# Patient Record
Sex: FEMALE | Race: WHITE | NOT HISPANIC OR LATINO | URBAN - METROPOLITAN AREA
[De-identification: names, ages, dates, MRNs, and addresses within clinical notes are randomized per-mention and may not be internally consistent; named-entity substitution may affect disease eponyms.]

---

## 2017-12-28 ENCOUNTER — APPOINTMENT (OUTPATIENT)
Dept: WOMENS IMAGING | Facility: HOSPITAL | Age: 42
End: 2017-12-28

## 2017-12-28 PROCEDURE — 77067 SCR MAMMO BI INCL CAD: CPT | Performed by: RADIOLOGY

## 2017-12-28 PROCEDURE — 77063 BREAST TOMOSYNTHESIS BI: CPT | Performed by: RADIOLOGY

## 2019-05-21 ENCOUNTER — APPOINTMENT (OUTPATIENT)
Dept: WOMENS IMAGING | Facility: HOSPITAL | Age: 44
End: 2019-05-21

## 2019-05-21 PROCEDURE — 77067 SCR MAMMO BI INCL CAD: CPT | Performed by: RADIOLOGY

## 2019-05-21 PROCEDURE — 77063 BREAST TOMOSYNTHESIS BI: CPT | Performed by: RADIOLOGY

## 2019-11-19 ENCOUNTER — OFFICE VISIT (OUTPATIENT)
Dept: GASTROENTEROLOGY | Facility: CLINIC | Age: 44
End: 2019-11-19

## 2019-11-19 VITALS — HEIGHT: 67 IN | BODY MASS INDEX: 36.88 KG/M2 | WEIGHT: 235 LBS

## 2019-11-19 DIAGNOSIS — R19.7 DIARRHEA, UNSPECIFIED TYPE: ICD-10-CM

## 2019-11-19 DIAGNOSIS — Z83.71 FH: COLON POLYPS: Primary | ICD-10-CM

## 2019-11-19 PROCEDURE — 99203 OFFICE O/P NEW LOW 30 MIN: CPT | Performed by: INTERNAL MEDICINE

## 2019-11-19 RX ORDER — ACETAMINOPHEN 325 MG/1
650 TABLET ORAL EVERY 6 HOURS PRN
COMMUNITY

## 2019-11-19 RX ORDER — NAPROXEN 500 MG/1
TABLET ORAL
Refills: 0 | COMMUNITY
Start: 2019-10-30 | End: 2022-04-12

## 2019-11-19 RX ORDER — DIPHENHYDRAMINE HCL 25 MG
25 CAPSULE ORAL EVERY 6 HOURS PRN
COMMUNITY
End: 2022-04-11

## 2020-06-01 RX ORDER — RIFAXIMIN 550 MG/1
TABLET ORAL
Qty: 42 TABLET | Refills: 2 | Status: SHIPPED | OUTPATIENT
Start: 2020-06-01 | End: 2022-09-30

## 2020-06-18 ENCOUNTER — APPOINTMENT (OUTPATIENT)
Dept: WOMENS IMAGING | Facility: HOSPITAL | Age: 45
End: 2020-06-18

## 2020-06-18 PROCEDURE — 77067 SCR MAMMO BI INCL CAD: CPT | Performed by: RADIOLOGY

## 2020-06-18 PROCEDURE — 77063 BREAST TOMOSYNTHESIS BI: CPT | Performed by: RADIOLOGY

## 2020-07-10 ENCOUNTER — APPOINTMENT (OUTPATIENT)
Dept: WOMENS IMAGING | Facility: HOSPITAL | Age: 45
End: 2020-07-10

## 2020-07-10 PROCEDURE — G0279 TOMOSYNTHESIS, MAMMO: HCPCS | Performed by: RADIOLOGY

## 2020-07-10 PROCEDURE — 77065 DX MAMMO INCL CAD UNI: CPT | Performed by: RADIOLOGY

## 2020-07-10 PROCEDURE — 76641 ULTRASOUND BREAST COMPLETE: CPT | Performed by: RADIOLOGY

## 2020-07-10 PROCEDURE — 77061 BREAST TOMOSYNTHESIS UNI: CPT | Performed by: RADIOLOGY

## 2021-03-30 ENCOUNTER — APPOINTMENT (OUTPATIENT)
Dept: WOMENS IMAGING | Facility: HOSPITAL | Age: 46
End: 2021-03-30

## 2021-03-30 PROCEDURE — 77065 DX MAMMO INCL CAD UNI: CPT | Performed by: RADIOLOGY

## 2021-03-30 PROCEDURE — 77061 BREAST TOMOSYNTHESIS UNI: CPT | Performed by: RADIOLOGY

## 2021-03-30 PROCEDURE — G0279 TOMOSYNTHESIS, MAMMO: HCPCS | Performed by: RADIOLOGY

## 2021-06-22 ENCOUNTER — APPOINTMENT (OUTPATIENT)
Dept: WOMENS IMAGING | Facility: HOSPITAL | Age: 46
End: 2021-06-22

## 2021-06-22 PROCEDURE — 77066 DX MAMMO INCL CAD BI: CPT | Performed by: RADIOLOGY

## 2021-06-22 PROCEDURE — 77062 BREAST TOMOSYNTHESIS BI: CPT | Performed by: RADIOLOGY

## 2021-06-22 PROCEDURE — G0279 TOMOSYNTHESIS, MAMMO: HCPCS | Performed by: RADIOLOGY

## 2021-11-24 ENCOUNTER — OFFICE VISIT (OUTPATIENT)
Dept: FAMILY MEDICINE CLINIC | Facility: CLINIC | Age: 46
End: 2021-11-24

## 2021-11-24 VITALS
WEIGHT: 243.6 LBS | TEMPERATURE: 97.7 F | DIASTOLIC BLOOD PRESSURE: 108 MMHG | SYSTOLIC BLOOD PRESSURE: 164 MMHG | BODY MASS INDEX: 38.24 KG/M2 | HEART RATE: 88 BPM | HEIGHT: 67 IN | OXYGEN SATURATION: 98 %

## 2021-11-24 DIAGNOSIS — R63.5 WEIGHT GAIN: ICD-10-CM

## 2021-11-24 DIAGNOSIS — J45.20 MILD INTERMITTENT ASTHMATIC BRONCHITIS WITHOUT COMPLICATION: ICD-10-CM

## 2021-11-24 DIAGNOSIS — R03.0 ELEVATED BP WITHOUT DIAGNOSIS OF HYPERTENSION: Primary | ICD-10-CM

## 2021-11-24 DIAGNOSIS — F90.2 ATTENTION DEFICIT HYPERACTIVITY DISORDER (ADHD), COMBINED TYPE: ICD-10-CM

## 2021-11-24 DIAGNOSIS — K58.9 IRRITABLE BOWEL SYNDROME, UNSPECIFIED TYPE: ICD-10-CM

## 2021-11-24 DIAGNOSIS — F41.9 ANXIETY: ICD-10-CM

## 2021-11-24 DIAGNOSIS — E66.01 MORBID (SEVERE) OBESITY DUE TO EXCESS CALORIES (HCC): ICD-10-CM

## 2021-11-24 PROBLEM — Z86.69 HISTORY OF MIGRAINE: Status: ACTIVE | Noted: 2021-11-24

## 2021-11-24 PROBLEM — F41.8 ANXIETY WITH DEPRESSION: Status: ACTIVE | Noted: 2021-11-24

## 2021-11-24 PROBLEM — I10 PRIMARY HYPERTENSION: Status: ACTIVE | Noted: 2021-11-24

## 2021-11-24 PROCEDURE — 99214 OFFICE O/P EST MOD 30 MIN: CPT | Performed by: FAMILY MEDICINE

## 2021-11-24 RX ORDER — BREXPIPRAZOLE 1 MG/1
1 TABLET ORAL DAILY
COMMUNITY

## 2021-11-25 PROBLEM — F42.9 OBSESSIVE-COMPULSIVE DISORDER: Status: ACTIVE | Noted: 2021-11-25

## 2021-11-25 LAB
ALBUMIN SERPL-MCNC: 4.3 G/DL (ref 3.8–4.8)
ALBUMIN/GLOB SERPL: 1.8 {RATIO} (ref 1.2–2.2)
ALP SERPL-CCNC: 65 IU/L (ref 44–121)
ALT SERPL-CCNC: 11 IU/L (ref 0–32)
AST SERPL-CCNC: 11 IU/L (ref 0–40)
BASOPHILS # BLD AUTO: 0 X10E3/UL (ref 0–0.2)
BASOPHILS NFR BLD AUTO: 0 %
BILIRUB SERPL-MCNC: <0.2 MG/DL (ref 0–1.2)
BUN SERPL-MCNC: 20 MG/DL (ref 6–24)
BUN/CREAT SERPL: 27 (ref 9–23)
CALCIUM SERPL-MCNC: 9.1 MG/DL (ref 8.7–10.2)
CHLORIDE SERPL-SCNC: 102 MMOL/L (ref 96–106)
CHOLEST SERPL-MCNC: 187 MG/DL (ref 100–199)
CO2 SERPL-SCNC: 22 MMOL/L (ref 20–29)
CREAT SERPL-MCNC: 0.73 MG/DL (ref 0.57–1)
EOSINOPHIL # BLD AUTO: 0 X10E3/UL (ref 0–0.4)
EOSINOPHIL NFR BLD AUTO: 0 %
ERYTHROCYTE [DISTWIDTH] IN BLOOD BY AUTOMATED COUNT: 11.8 % (ref 11.7–15.4)
GLOBULIN SER CALC-MCNC: 2.4 G/DL (ref 1.5–4.5)
GLUCOSE SERPL-MCNC: 93 MG/DL (ref 65–99)
HCT VFR BLD AUTO: 44.9 % (ref 34–46.6)
HDLC SERPL-MCNC: 77 MG/DL
HGB BLD-MCNC: 14.8 G/DL (ref 11.1–15.9)
IMM GRANULOCYTES # BLD AUTO: 0 X10E3/UL (ref 0–0.1)
IMM GRANULOCYTES NFR BLD AUTO: 0 %
LDLC SERPL CALC-MCNC: 94 MG/DL (ref 0–99)
LDLC/HDLC SERPL: 1.2 RATIO (ref 0–3.2)
LYMPHOCYTES # BLD AUTO: 0.7 X10E3/UL (ref 0.7–3.1)
LYMPHOCYTES NFR BLD AUTO: 8 %
MCH RBC QN AUTO: 30.4 PG (ref 26.6–33)
MCHC RBC AUTO-ENTMCNC: 33 G/DL (ref 31.5–35.7)
MCV RBC AUTO: 92 FL (ref 79–97)
MONOCYTES # BLD AUTO: 0.3 X10E3/UL (ref 0.1–0.9)
MONOCYTES NFR BLD AUTO: 3 %
NEUTROPHILS # BLD AUTO: 8.4 X10E3/UL (ref 1.4–7)
NEUTROPHILS NFR BLD AUTO: 89 %
PLATELET # BLD AUTO: 353 X10E3/UL (ref 150–450)
POTASSIUM SERPL-SCNC: 4.3 MMOL/L (ref 3.5–5.2)
PROT SERPL-MCNC: 6.7 G/DL (ref 6–8.5)
RBC # BLD AUTO: 4.87 X10E6/UL (ref 3.77–5.28)
SODIUM SERPL-SCNC: 139 MMOL/L (ref 134–144)
TRIGL SERPL-MCNC: 92 MG/DL (ref 0–149)
TSH SERPL DL<=0.005 MIU/L-ACNC: 0.44 UIU/ML (ref 0.45–4.5)
VLDLC SERPL CALC-MCNC: 16 MG/DL (ref 5–40)
WBC # BLD AUTO: 9.5 X10E3/UL (ref 3.4–10.8)

## 2021-12-20 ENCOUNTER — OFFICE VISIT (OUTPATIENT)
Dept: FAMILY MEDICINE CLINIC | Facility: CLINIC | Age: 46
End: 2021-12-20

## 2021-12-20 VITALS
TEMPERATURE: 96.6 F | BODY MASS INDEX: 39.21 KG/M2 | WEIGHT: 249.8 LBS | HEART RATE: 96 BPM | SYSTOLIC BLOOD PRESSURE: 120 MMHG | HEIGHT: 67 IN | DIASTOLIC BLOOD PRESSURE: 84 MMHG | OXYGEN SATURATION: 99 %

## 2021-12-20 DIAGNOSIS — R63.5 WEIGHT GAIN: ICD-10-CM

## 2021-12-20 DIAGNOSIS — Z12.11 ENCOUNTER FOR SCREENING COLONOSCOPY: ICD-10-CM

## 2021-12-20 DIAGNOSIS — Z00.00 WELLNESS EXAMINATION: Primary | ICD-10-CM

## 2021-12-20 DIAGNOSIS — R03.0 ELEVATED BP WITHOUT DIAGNOSIS OF HYPERTENSION: ICD-10-CM

## 2021-12-20 DIAGNOSIS — R79.89 ABNORMAL TSH: ICD-10-CM

## 2021-12-20 DIAGNOSIS — E66.01 MORBID (SEVERE) OBESITY DUE TO EXCESS CALORIES (HCC): ICD-10-CM

## 2021-12-20 PROBLEM — I10 PRIMARY HYPERTENSION: Status: RESOLVED | Noted: 2021-11-24 | Resolved: 2021-12-20

## 2021-12-20 PROCEDURE — 99396 PREV VISIT EST AGE 40-64: CPT | Performed by: FAMILY MEDICINE

## 2021-12-20 PROCEDURE — 99213 OFFICE O/P EST LOW 20 MIN: CPT | Performed by: FAMILY MEDICINE

## 2021-12-21 ENCOUNTER — APPOINTMENT (OUTPATIENT)
Dept: WOMENS IMAGING | Facility: HOSPITAL | Age: 46
End: 2021-12-21

## 2021-12-21 PROCEDURE — 77065 DX MAMMO INCL CAD UNI: CPT | Performed by: RADIOLOGY

## 2021-12-21 PROCEDURE — 77061 BREAST TOMOSYNTHESIS UNI: CPT | Performed by: RADIOLOGY

## 2021-12-21 PROCEDURE — G0279 TOMOSYNTHESIS, MAMMO: HCPCS | Performed by: RADIOLOGY

## 2021-12-25 LAB
FSH SERPL-ACNC: 0.5 MIU/ML
HCV AB S/CO SERPL IA: <0.1 S/CO RATIO (ref 0–0.9)
INSULIN FREE SERPL-ACNC: 8.1 UU/ML
INSULIN SERPL-ACNC: 8.1 UU/ML
LH SERPL-ACNC: <0.3 MIU/ML
T4 FREE SERPL-MCNC: 1.07 NG/DL (ref 0.82–1.77)
TSH SERPL DL<=0.005 MIU/L-ACNC: 2.75 UIU/ML (ref 0.45–4.5)

## 2022-03-08 ENCOUNTER — PRE-PROCEDURE SCREENING (OUTPATIENT)
Dept: GASTROENTEROLOGY | Facility: CLINIC | Age: 47
End: 2022-03-08

## 2022-04-01 ENCOUNTER — OFFICE VISIT (OUTPATIENT)
Dept: FAMILY MEDICINE CLINIC | Facility: CLINIC | Age: 47
End: 2022-04-01

## 2022-04-01 VITALS
BODY MASS INDEX: 39.11 KG/M2 | WEIGHT: 249.2 LBS | TEMPERATURE: 97.5 F | HEIGHT: 67 IN | OXYGEN SATURATION: 96 % | SYSTOLIC BLOOD PRESSURE: 130 MMHG | HEART RATE: 90 BPM | DIASTOLIC BLOOD PRESSURE: 90 MMHG

## 2022-04-01 DIAGNOSIS — F41.8 ANXIETY WITH DEPRESSION: ICD-10-CM

## 2022-04-01 DIAGNOSIS — F90.2 ATTENTION DEFICIT HYPERACTIVITY DISORDER (ADHD), COMBINED TYPE: ICD-10-CM

## 2022-04-01 DIAGNOSIS — E66.01 MORBID (SEVERE) OBESITY DUE TO EXCESS CALORIES: ICD-10-CM

## 2022-04-01 DIAGNOSIS — I10 PRIMARY HYPERTENSION: Primary | ICD-10-CM

## 2022-04-01 PROBLEM — R63.5 WEIGHT GAIN: Status: RESOLVED | Noted: 2021-11-24 | Resolved: 2022-04-01

## 2022-04-01 PROBLEM — R79.89 ABNORMAL TSH: Status: RESOLVED | Noted: 2021-12-20 | Resolved: 2022-04-01

## 2022-04-01 PROBLEM — R03.0 ELEVATED BP WITHOUT DIAGNOSIS OF HYPERTENSION: Status: RESOLVED | Noted: 2021-11-24 | Resolved: 2022-04-01

## 2022-04-01 LAB
BILIRUB BLD-MCNC: NEGATIVE MG/DL
CLARITY, POC: CLEAR
COLOR UR: YELLOW
EXPIRATION DATE: ABNORMAL
GLUCOSE UR STRIP-MCNC: NEGATIVE MG/DL
KETONES UR QL: NEGATIVE
LEUKOCYTE EST, POC: ABNORMAL
Lab: ABNORMAL
NITRITE UR-MCNC: NEGATIVE MG/ML
PH UR: 5.5 [PH] (ref 5–8)
PROT UR STRIP-MCNC: NEGATIVE MG/DL
RBC # UR STRIP: ABNORMAL /UL
SP GR UR: 1.03 (ref 1–1.03)
UROBILINOGEN UR QL: NORMAL

## 2022-04-01 PROCEDURE — 99214 OFFICE O/P EST MOD 30 MIN: CPT | Performed by: FAMILY MEDICINE

## 2022-04-01 PROCEDURE — 81003 URINALYSIS AUTO W/O SCOPE: CPT | Performed by: FAMILY MEDICINE

## 2022-04-01 RX ORDER — LISINOPRIL 20 MG/1
20 TABLET ORAL DAILY
Qty: 30 TABLET | Refills: 2 | OUTPATIENT
Start: 2022-04-01 | End: 2022-04-12

## 2022-04-11 ENCOUNTER — TELEPHONE (OUTPATIENT)
Dept: FAMILY MEDICINE CLINIC | Facility: CLINIC | Age: 47
End: 2022-04-11

## 2022-04-12 ENCOUNTER — HOSPITAL ENCOUNTER (EMERGENCY)
Facility: HOSPITAL | Age: 47
Discharge: HOME | End: 2022-04-12
Attending: EMERGENCY MEDICINE | Admitting: EMERGENCY MEDICINE

## 2022-04-12 VITALS
DIASTOLIC BLOOD PRESSURE: 87 MMHG | SYSTOLIC BLOOD PRESSURE: 133 MMHG | RESPIRATION RATE: 16 BRPM | HEART RATE: 68 BPM | BODY MASS INDEX: 39.24 KG/M2 | OXYGEN SATURATION: 98 % | WEIGHT: 250 LBS | HEIGHT: 67 IN | TEMPERATURE: 97.2 F

## 2022-04-12 DIAGNOSIS — T46.4X5A ANGIOEDEMA DUE TO ANGIOTENSIN CONVERTING ENZYME INHIBITOR (ACE-I): Primary | ICD-10-CM

## 2022-04-12 DIAGNOSIS — T78.3XXA ANGIOEDEMA DUE TO ANGIOTENSIN CONVERTING ENZYME INHIBITOR (ACE-I): Primary | ICD-10-CM

## 2022-04-12 PROCEDURE — 99282 EMERGENCY DEPT VISIT SF MDM: CPT

## 2022-06-22 ENCOUNTER — TELEPHONE (OUTPATIENT)
Dept: FAMILY MEDICINE CLINIC | Facility: CLINIC | Age: 47
End: 2022-06-22

## 2022-07-13 ENCOUNTER — APPOINTMENT (OUTPATIENT)
Dept: WOMENS IMAGING | Facility: HOSPITAL | Age: 47
End: 2022-07-13

## 2022-07-13 PROCEDURE — 77066 DX MAMMO INCL CAD BI: CPT | Performed by: RADIOLOGY

## 2022-07-13 PROCEDURE — G0279 TOMOSYNTHESIS, MAMMO: HCPCS | Performed by: RADIOLOGY

## 2022-07-13 PROCEDURE — 77062 BREAST TOMOSYNTHESIS BI: CPT | Performed by: RADIOLOGY

## 2022-09-23 ENCOUNTER — TELEPHONE (OUTPATIENT)
Dept: FAMILY MEDICINE CLINIC | Facility: CLINIC | Age: 47
End: 2022-09-23

## 2022-09-23 RX ORDER — OXYCODONE AND ACETAMINOPHEN 7.5; 325 MG/1; MG/1
1 TABLET ORAL EVERY 6 HOURS PRN
Qty: 15 TABLET | Refills: 0 | Status: SHIPPED | OUTPATIENT
Start: 2022-09-23 | End: 2022-09-30

## 2022-09-30 ENCOUNTER — TELEMEDICINE (OUTPATIENT)
Dept: FAMILY MEDICINE CLINIC | Facility: CLINIC | Age: 47
End: 2022-09-30

## 2022-09-30 VITALS — SYSTOLIC BLOOD PRESSURE: 115 MMHG | DIASTOLIC BLOOD PRESSURE: 71 MMHG

## 2022-09-30 DIAGNOSIS — I10 PRIMARY HYPERTENSION: Primary | ICD-10-CM

## 2022-09-30 DIAGNOSIS — S42.342D CLOSED DISPLACED SPIRAL FRACTURE OF SHAFT OF LEFT HUMERUS WITH ROUTINE HEALING, SUBSEQUENT ENCOUNTER: ICD-10-CM

## 2022-09-30 DIAGNOSIS — T78.3XXD ANGIOEDEMA, SUBSEQUENT ENCOUNTER: ICD-10-CM

## 2022-09-30 DIAGNOSIS — Z01.818 PREOPERATIVE CLEARANCE: ICD-10-CM

## 2022-09-30 DIAGNOSIS — E66.01 MORBID (SEVERE) OBESITY DUE TO EXCESS CALORIES: ICD-10-CM

## 2022-09-30 PROBLEM — S42.342A CLOSED DISPLACED SPIRAL FRACTURE OF SHAFT OF LEFT HUMERUS: Status: ACTIVE | Noted: 2022-09-28

## 2022-09-30 PROBLEM — T78.3XXA ANGIO-EDEMA: Status: ACTIVE | Noted: 2022-09-30

## 2022-09-30 PROCEDURE — 99214 OFFICE O/P EST MOD 30 MIN: CPT | Performed by: FAMILY MEDICINE

## 2022-09-30 RX ORDER — CLONAZEPAM 1 MG/1
1 TABLET ORAL 3 TIMES DAILY
COMMUNITY
Start: 2022-09-26

## 2022-09-30 RX ORDER — HYDROCODONE BITARTRATE AND ACETAMINOPHEN 5; 325 MG/1; MG/1
1 TABLET ORAL EVERY 6 HOURS PRN
COMMUNITY
Start: 2022-09-28

## 2022-09-30 RX ORDER — DESOGESTREL AND ETHINYL ESTRADIOL 0.15-0.03
1 KIT ORAL DAILY
COMMUNITY
Start: 2022-09-21

## 2022-09-30 RX ORDER — FLUOXETINE HYDROCHLORIDE 20 MG/1
CAPSULE ORAL
COMMUNITY
Start: 2022-09-06

## 2023-04-24 ENCOUNTER — TELEPHONE (OUTPATIENT)
Dept: FAMILY MEDICINE CLINIC | Facility: CLINIC | Age: 48
End: 2023-04-24
Payer: COMMERCIAL

## 2024-02-19 ENCOUNTER — OFFICE VISIT (OUTPATIENT)
Dept: FAMILY MEDICINE CLINIC | Facility: CLINIC | Age: 49
End: 2024-02-19
Payer: COMMERCIAL

## 2024-02-19 VITALS
WEIGHT: 250 LBS | DIASTOLIC BLOOD PRESSURE: 83 MMHG | SYSTOLIC BLOOD PRESSURE: 128 MMHG | HEART RATE: 100 BPM | BODY MASS INDEX: 39.24 KG/M2 | HEIGHT: 67 IN | OXYGEN SATURATION: 97 %

## 2024-02-19 DIAGNOSIS — F10.90 ALCOHOL INTAKE ABOVE RECOMMENDED SENSIBLE LIMITS WITHOUT COMPLICATION: ICD-10-CM

## 2024-02-19 DIAGNOSIS — R25.1 TREMOR: ICD-10-CM

## 2024-02-19 DIAGNOSIS — R73.9 HYPERGLYCEMIA: Primary | ICD-10-CM

## 2024-02-19 DIAGNOSIS — E66.01 CLASS 2 SEVERE OBESITY DUE TO EXCESS CALORIES WITH SERIOUS COMORBIDITY AND BODY MASS INDEX (BMI) OF 39.0 TO 39.9 IN ADULT: ICD-10-CM

## 2024-02-19 DIAGNOSIS — Z12.11 ENCOUNTER FOR SCREENING FOR MALIGNANT NEOPLASM OF COLON: ICD-10-CM

## 2024-02-19 DIAGNOSIS — Z00.00 ANNUAL PHYSICAL EXAM: ICD-10-CM

## 2024-02-19 DIAGNOSIS — F41.8 ANXIETY WITH DEPRESSION: ICD-10-CM

## 2024-02-19 PROBLEM — R03.0 ELEVATED BLOOD-PRESSURE READING WITHOUT DIAGNOSIS OF HYPERTENSION: Status: RESOLVED | Noted: 2021-11-24 | Resolved: 2024-02-19

## 2024-02-19 PROBLEM — Z01.818 PREOPERATIVE CLEARANCE: Status: RESOLVED | Noted: 2022-09-30 | Resolved: 2024-02-19

## 2024-02-19 PROBLEM — Z87.81 S/P ORIF (OPEN REDUCTION INTERNAL FIXATION) FRACTURE: Status: ACTIVE | Noted: 2022-10-24

## 2024-02-19 PROBLEM — Z98.890 S/P ORIF (OPEN REDUCTION INTERNAL FIXATION) FRACTURE: Status: ACTIVE | Noted: 2022-10-24

## 2024-02-19 PROBLEM — S42.342A CLOSED DISPLACED SPIRAL FRACTURE OF SHAFT OF LEFT HUMERUS: Status: RESOLVED | Noted: 2022-09-28 | Resolved: 2024-02-19

## 2024-02-19 PROBLEM — S42.353A: Status: RESOLVED | Noted: 2022-10-04 | Resolved: 2024-02-19

## 2024-02-19 PROBLEM — E66.9 OBESITY: Status: ACTIVE | Noted: 2021-11-24

## 2024-02-19 PROBLEM — I10 PRIMARY HYPERTENSION: Status: RESOLVED | Noted: 2022-04-01 | Resolved: 2024-02-19

## 2024-02-19 PROCEDURE — 99214 OFFICE O/P EST MOD 30 MIN: CPT | Performed by: FAMILY MEDICINE

## 2024-02-19 PROCEDURE — 99396 PREV VISIT EST AGE 40-64: CPT | Performed by: FAMILY MEDICINE

## 2024-02-20 DIAGNOSIS — E83.51 HYPOCALCEMIA: ICD-10-CM

## 2024-02-20 DIAGNOSIS — D72.9 NEUTROPHILIA: Primary | ICD-10-CM

## 2024-02-20 LAB
25(OH)D3+25(OH)D2 SERPL-MCNC: 21.3 NG/ML (ref 30–100)
ALBUMIN SERPL-MCNC: 3.8 G/DL (ref 3.5–5.2)
ALBUMIN/GLOB SERPL: 1.5 G/DL
ALP SERPL-CCNC: 88 U/L (ref 39–117)
ALT SERPL-CCNC: 7 U/L (ref 1–33)
AST SERPL-CCNC: 11 U/L (ref 1–32)
BASOPHILS # BLD AUTO: 0.05 10*3/MM3 (ref 0–0.2)
BASOPHILS NFR BLD AUTO: 0.4 % (ref 0–1.5)
BILIRUB SERPL-MCNC: <0.2 MG/DL (ref 0–1.2)
BUN SERPL-MCNC: 17 MG/DL (ref 6–20)
BUN/CREAT SERPL: 16.7 (ref 7–25)
CALCIUM SERPL-MCNC: 8.4 MG/DL (ref 8.6–10.5)
CHLORIDE SERPL-SCNC: 103 MMOL/L (ref 98–107)
CHOLEST SERPL-MCNC: 177 MG/DL (ref 0–200)
CO2 SERPL-SCNC: 20.2 MMOL/L (ref 22–29)
CREAT SERPL-MCNC: 1.02 MG/DL (ref 0.57–1)
EGFRCR SERPLBLD CKD-EPI 2021: 68.4 ML/MIN/1.73
EOSINOPHIL # BLD AUTO: 0.07 10*3/MM3 (ref 0–0.4)
EOSINOPHIL NFR BLD AUTO: 0.6 % (ref 0.3–6.2)
ERYTHROCYTE [DISTWIDTH] IN BLOOD BY AUTOMATED COUNT: 12 % (ref 12.3–15.4)
GLOBULIN SER CALC-MCNC: 2.5 GM/DL
GLUCOSE SERPL-MCNC: 132 MG/DL (ref 65–99)
HBA1C MFR BLD: 5.2 % (ref 4.8–5.6)
HCT VFR BLD AUTO: 40.3 % (ref 34–46.6)
HDLC SERPL-MCNC: 69 MG/DL (ref 40–60)
HGB BLD-MCNC: 13.4 G/DL (ref 12–15.9)
IMM GRANULOCYTES # BLD AUTO: 0.03 10*3/MM3 (ref 0–0.05)
IMM GRANULOCYTES NFR BLD AUTO: 0.3 % (ref 0–0.5)
LDLC SERPL CALC-MCNC: 83 MG/DL (ref 0–100)
LYMPHOCYTES # BLD AUTO: 1.82 10*3/MM3 (ref 0.7–3.1)
LYMPHOCYTES NFR BLD AUTO: 15.9 % (ref 19.6–45.3)
MCH RBC QN AUTO: 30.5 PG (ref 26.6–33)
MCHC RBC AUTO-ENTMCNC: 33.3 G/DL (ref 31.5–35.7)
MCV RBC AUTO: 91.8 FL (ref 79–97)
MONOCYTES # BLD AUTO: 0.67 10*3/MM3 (ref 0.1–0.9)
MONOCYTES NFR BLD AUTO: 5.8 % (ref 5–12)
NEUTROPHILS # BLD AUTO: 8.84 10*3/MM3 (ref 1.7–7)
NEUTROPHILS NFR BLD AUTO: 77 % (ref 42.7–76)
NRBC BLD AUTO-RTO: 0 /100 WBC (ref 0–0.2)
PLATELET # BLD AUTO: 348 10*3/MM3 (ref 140–450)
POTASSIUM SERPL-SCNC: 3.8 MMOL/L (ref 3.5–5.2)
PROT SERPL-MCNC: 6.3 G/DL (ref 6–8.5)
RBC # BLD AUTO: 4.39 10*6/MM3 (ref 3.77–5.28)
SODIUM SERPL-SCNC: 140 MMOL/L (ref 136–145)
TRIGL SERPL-MCNC: 147 MG/DL (ref 0–150)
TSH SERPL DL<=0.005 MIU/L-ACNC: 2.22 UIU/ML (ref 0.27–4.2)
VIT B12 SERPL-MCNC: 291 PG/ML (ref 211–946)
VLDLC SERPL CALC-MCNC: 25 MG/DL (ref 5–40)
WBC # BLD AUTO: 11.48 10*3/MM3 (ref 3.4–10.8)

## 2024-04-05 ENCOUNTER — PREP FOR SURGERY (OUTPATIENT)
Dept: SURGERY | Facility: SURGERY CENTER | Age: 49
End: 2024-04-05
Payer: COMMERCIAL

## 2024-04-05 DIAGNOSIS — Z12.11 ENCOUNTER FOR SCREENING FOR MALIGNANT NEOPLASM OF COLON: Primary | ICD-10-CM

## 2024-05-29 PROBLEM — Z12.11 ENCOUNTER FOR SCREENING FOR MALIGNANT NEOPLASM OF COLON: Status: ACTIVE | Noted: 2024-04-05

## 2024-06-18 ENCOUNTER — OFFICE VISIT (OUTPATIENT)
Dept: ORTHOPEDIC SURGERY | Facility: CLINIC | Age: 49
End: 2024-06-18
Payer: COMMERCIAL

## 2024-06-18 VITALS — WEIGHT: 258.2 LBS | BODY MASS INDEX: 40.53 KG/M2 | HEIGHT: 67 IN | TEMPERATURE: 97.5 F

## 2024-06-18 DIAGNOSIS — R52 PAIN: Primary | ICD-10-CM

## 2024-06-18 DIAGNOSIS — M17.11 PRIMARY OSTEOARTHRITIS OF RIGHT KNEE: ICD-10-CM

## 2024-06-18 PROCEDURE — 20610 DRAIN/INJ JOINT/BURSA W/O US: CPT | Performed by: NURSE PRACTITIONER

## 2024-06-18 PROCEDURE — 73562 X-RAY EXAM OF KNEE 3: CPT | Performed by: NURSE PRACTITIONER

## 2024-06-18 PROCEDURE — 99213 OFFICE O/P EST LOW 20 MIN: CPT | Performed by: NURSE PRACTITIONER

## 2024-06-18 RX ORDER — CELECOXIB 200 MG/1
200 CAPSULE ORAL DAILY
Qty: 30 CAPSULE | Refills: 0 | Status: SHIPPED | OUTPATIENT
Start: 2024-06-18

## 2024-06-18 RX ORDER — LIDOCAINE HYDROCHLORIDE 10 MG/ML
5 INJECTION, SOLUTION EPIDURAL; INFILTRATION; INTRACAUDAL; PERINEURAL
Status: COMPLETED | OUTPATIENT
Start: 2024-06-18 | End: 2024-06-18

## 2024-06-18 RX ORDER — METHYLPREDNISOLONE ACETATE 80 MG/ML
80 INJECTION, SUSPENSION INTRA-ARTICULAR; INTRALESIONAL; INTRAMUSCULAR; SOFT TISSUE
Status: COMPLETED | OUTPATIENT
Start: 2024-06-18 | End: 2024-06-18

## 2024-06-18 RX ADMIN — METHYLPREDNISOLONE ACETATE 80 MG: 80 INJECTION, SUSPENSION INTRA-ARTICULAR; INTRALESIONAL; INTRAMUSCULAR; SOFT TISSUE at 09:27

## 2024-06-18 RX ADMIN — LIDOCAINE HYDROCHLORIDE 5 ML: 10 INJECTION, SOLUTION EPIDURAL; INFILTRATION; INTRACAUDAL; PERINEURAL at 09:27

## 2024-07-10 ENCOUNTER — ANESTHESIA EVENT (OUTPATIENT)
Dept: GASTROENTEROLOGY | Facility: HOSPITAL | Age: 49
End: 2024-07-10
Payer: COMMERCIAL

## 2024-07-10 ENCOUNTER — HOSPITAL ENCOUNTER (OUTPATIENT)
Facility: HOSPITAL | Age: 49
Setting detail: HOSPITAL OUTPATIENT SURGERY
Discharge: HOME | End: 2024-07-10
Attending: SURGERY | Admitting: SURGERY
Payer: COMMERCIAL

## 2024-07-10 ENCOUNTER — ANESTHESIA (OUTPATIENT)
Dept: GASTROENTEROLOGY | Facility: HOSPITAL | Age: 49
End: 2024-07-10
Payer: COMMERCIAL

## 2024-07-10 VITALS
BODY MASS INDEX: 39.55 KG/M2 | RESPIRATION RATE: 16 BRPM | OXYGEN SATURATION: 100 % | HEART RATE: 62 BPM | HEIGHT: 67 IN | SYSTOLIC BLOOD PRESSURE: 136 MMHG | WEIGHT: 252 LBS | DIASTOLIC BLOOD PRESSURE: 82 MMHG

## 2024-07-10 DIAGNOSIS — Z12.11 ENCOUNTER FOR SCREENING FOR MALIGNANT NEOPLASM OF COLON: ICD-10-CM

## 2024-07-10 PROCEDURE — 88305 TISSUE EXAM BY PATHOLOGIST: CPT | Performed by: SURGERY

## 2024-07-10 PROCEDURE — 25010000002 GLUCAGON (RDNA) PER 1 MG: Performed by: SURGERY

## 2024-07-10 PROCEDURE — S0260 H&P FOR SURGERY: HCPCS | Performed by: SURGERY

## 2024-07-10 PROCEDURE — 45385 COLONOSCOPY W/LESION REMOVAL: CPT | Performed by: SURGERY

## 2024-07-10 PROCEDURE — 25810000003 LACTATED RINGERS PER 1000 ML: Performed by: SURGERY

## 2024-07-10 PROCEDURE — 25010000002 PROPOFOL 200 MG/20ML EMULSION: Performed by: REGISTERED NURSE

## 2024-07-10 PROCEDURE — 25010000002 PROPOFOL 1000 MG/100ML EMULSION: Performed by: REGISTERED NURSE

## 2024-07-10 RX ORDER — PROPOFOL 10 MG/ML
INJECTION, EMULSION INTRAVENOUS AS NEEDED
Status: DISCONTINUED | OUTPATIENT
Start: 2024-07-10 | End: 2024-07-10 | Stop reason: SURG

## 2024-07-10 RX ORDER — PROPOFOL 10 MG/ML
INJECTION, EMULSION INTRAVENOUS CONTINUOUS PRN
Status: DISCONTINUED | OUTPATIENT
Start: 2024-07-10 | End: 2024-07-10 | Stop reason: SURG

## 2024-07-10 RX ORDER — SODIUM CHLORIDE, SODIUM LACTATE, POTASSIUM CHLORIDE, CALCIUM CHLORIDE 600; 310; 30; 20 MG/100ML; MG/100ML; MG/100ML; MG/100ML
30 INJECTION, SOLUTION INTRAVENOUS CONTINUOUS PRN
Status: DISCONTINUED | OUTPATIENT
Start: 2024-07-10 | End: 2024-07-10 | Stop reason: HOSPADM

## 2024-07-10 RX ORDER — IBUPROFEN 600 MG/1
TABLET ORAL AS NEEDED
Status: DISCONTINUED | OUTPATIENT
Start: 2024-07-10 | End: 2024-07-10 | Stop reason: HOSPADM

## 2024-07-10 RX ORDER — LIDOCAINE HYDROCHLORIDE 20 MG/ML
INJECTION, SOLUTION INFILTRATION; PERINEURAL AS NEEDED
Status: DISCONTINUED | OUTPATIENT
Start: 2024-07-10 | End: 2024-07-10 | Stop reason: SURG

## 2024-07-10 RX ADMIN — SODIUM CHLORIDE, POTASSIUM CHLORIDE, SODIUM LACTATE AND CALCIUM CHLORIDE 30 ML/HR: 600; 310; 30; 20 INJECTION, SOLUTION INTRAVENOUS at 11:53

## 2024-07-10 RX ADMIN — PROPOFOL 180 MCG/KG/MIN: 10 INJECTION, EMULSION INTRAVENOUS at 12:38

## 2024-07-10 RX ADMIN — PROPOFOL INJECTABLE EMULSION 80 MG: 10 INJECTION, EMULSION INTRAVENOUS at 12:38

## 2024-07-10 RX ADMIN — LIDOCAINE HYDROCHLORIDE 80 MG: 20 INJECTION, SOLUTION INFILTRATION; PERINEURAL at 12:38

## 2024-07-11 ENCOUNTER — TELEPHONE (OUTPATIENT)
Dept: SURGERY | Facility: CLINIC | Age: 49
End: 2024-07-11
Payer: COMMERCIAL

## 2024-07-11 LAB
LAB AP CASE REPORT: NORMAL
PATH REPORT.FINAL DX SPEC: NORMAL
PATH REPORT.GROSS SPEC: NORMAL

## 2024-07-15 DIAGNOSIS — M17.11 PRIMARY OSTEOARTHRITIS OF RIGHT KNEE: Primary | ICD-10-CM

## 2024-07-22 DIAGNOSIS — M17.11 PRIMARY OSTEOARTHRITIS OF RIGHT KNEE: Primary | ICD-10-CM

## 2024-07-22 RX ORDER — CELECOXIB 200 MG/1
200 CAPSULE ORAL DAILY
Qty: 30 CAPSULE | Refills: 0 | Status: SHIPPED | OUTPATIENT
Start: 2024-07-22

## 2024-07-22 RX ORDER — CELECOXIB 200 MG/1
200 CAPSULE ORAL DAILY
Qty: 30 CAPSULE | Refills: 0 | OUTPATIENT
Start: 2024-07-22

## 2024-07-22 RX ORDER — CELECOXIB 200 MG/1
200 CAPSULE ORAL DAILY
Qty: 90 CAPSULE | OUTPATIENT
Start: 2024-07-22

## 2024-07-26 RX ORDER — CELECOXIB 200 MG/1
200 CAPSULE ORAL DAILY
Qty: 30 CAPSULE | Refills: 0 | OUTPATIENT
Start: 2024-07-26

## 2024-08-26 RX ORDER — CELECOXIB 200 MG/1
200 CAPSULE ORAL DAILY
Qty: 30 CAPSULE | Refills: 0 | Status: SHIPPED | OUTPATIENT
Start: 2024-08-26

## 2024-09-24 RX ORDER — CELECOXIB 200 MG/1
200 CAPSULE ORAL DAILY
Qty: 30 CAPSULE | Refills: 0 | Status: SHIPPED | OUTPATIENT
Start: 2024-09-24

## 2024-10-22 RX ORDER — CELECOXIB 200 MG/1
200 CAPSULE ORAL DAILY
Qty: 30 CAPSULE | Refills: 0 | Status: SHIPPED | OUTPATIENT
Start: 2024-10-22

## 2024-10-23 RX ORDER — CELECOXIB 200 MG/1
200 CAPSULE ORAL DAILY
Qty: 90 CAPSULE | OUTPATIENT
Start: 2024-10-23

## 2024-11-01 DIAGNOSIS — M17.11 PRIMARY OSTEOARTHRITIS OF RIGHT KNEE: Primary | ICD-10-CM

## 2024-11-01 RX ORDER — CELECOXIB 200 MG/1
200 CAPSULE ORAL AS NEEDED
Qty: 30 CAPSULE | Refills: 2 | Status: SHIPPED | OUTPATIENT
Start: 2024-11-01

## 2024-11-20 ENCOUNTER — TELEPHONE (OUTPATIENT)
Dept: GASTROENTEROLOGY | Facility: CLINIC | Age: 49
End: 2024-11-20

## 2024-11-20 NOTE — TELEPHONE ENCOUNTER
Caller: Valeria Michel    Relationship: Self    Best call back number: [phone]    What is the best time to reach you: ANYTIME    Who are you requesting to speak with (clinical staff, provider,  specific staff member): REFERRAL    Do you know the name of the person who called: RYAN    What was the call regarding: SCHEDULING WITH PATRIC FROM REFERRAL    Is it okay if the provider responds through MyChart: NO

## 2025-01-21 ENCOUNTER — OFFICE VISIT (OUTPATIENT)
Dept: GASTROENTEROLOGY | Facility: CLINIC | Age: 50
End: 2025-01-21
Payer: COMMERCIAL

## 2025-01-21 VITALS
HEIGHT: 67 IN | HEART RATE: 90 BPM | WEIGHT: 238 LBS | DIASTOLIC BLOOD PRESSURE: 92 MMHG | BODY MASS INDEX: 37.35 KG/M2 | SYSTOLIC BLOOD PRESSURE: 146 MMHG

## 2025-01-21 DIAGNOSIS — K58.0 IRRITABLE BOWEL SYNDROME WITH DIARRHEA: Primary | ICD-10-CM

## 2025-01-21 PROCEDURE — 99203 OFFICE O/P NEW LOW 30 MIN: CPT | Performed by: PHYSICIAN ASSISTANT

## 2025-01-21 NOTE — PROGRESS NOTES
"Chief Complaint  Irritable Bowel Syndrome and Diarrhea    Subjective          History Of Present Illness:    Valeria Michel is a  49 y.o. female patient of Dr. Cabrera who presents a follow up for diarrhea.     Patient reports she hs been experiencing diarrhea for many years. She usually goes around once a day. Patient denies any abdominal pain. Patient denies any black or bloody stools. She uses imodium during her episodes of diarrhea which she finds helpful. She improves her diarrhea by fasting. Patient is on celebrex for her knee.  She is not experiencing any upper GI symptoms such as nausea or vomiting.    Patient has never had abdominal surgery.     Patient had a colonoscopy with Dr. Olmedo with one 5 mm sessile polyp in the descending colon.  Biopsies consistent with tubular adenoma.  Recall 5 years.    She reports a family history of IBS in her father.    Objective   Vital Signs:   /92 (BP Location: Left arm, Patient Position: Sitting, Cuff Size: Large Adult)   Pulse 90   Ht 170.2 cm (67\")   Wt 108 kg (238 lb)   BMI 37.28 kg/m²       Physical Exam  Vitals reviewed.   Constitutional:       General: She is not in acute distress.     Appearance: Normal appearance. She is not ill-appearing.   HENT:      Head: Normocephalic and atraumatic.      Nose: Nose normal.      Mouth/Throat:      Pharynx: Oropharynx is clear.   Eyes:      Conjunctiva/sclera: Conjunctivae normal.   Pulmonary:      Effort: Pulmonary effort is normal.   Abdominal:      General: There is no distension.      Palpations: Abdomen is soft. There is no mass.      Tenderness: There is no abdominal tenderness.   Musculoskeletal:         General: No swelling. Normal range of motion.      Cervical back: Normal range of motion.   Skin:     General: Skin is warm and dry.      Findings: No bruising or rash.   Neurological:      General: No focal deficit present.      Mental Status: She is alert and oriented to person, place, and time.      " Motor: No weakness.      Gait: Gait normal.   Psychiatric:         Mood and Affect: Mood normal.          Result Review :   The following data was reviewed by: Marly Brown PA-C on 01/21/2025:  CMP          2/19/2024    15:44 4/3/2024    13:10   CMP   Glucose 132  68    BUN 17  15    Creatinine 1.02  0.79    Sodium 140  138    Potassium 3.8  4.2    Chloride 103  103    Calcium 8.4  8.5    Total Protein 6.3     Albumin 3.8     Globulin 2.5     Total Bilirubin <0.2     Alkaline Phosphatase 88     AST (SGOT) 11     ALT (SGPT) 7     BUN/Creatinine Ratio 16.7  19.0      CBC          2/19/2024    15:44 4/3/2024    13:10   CBC   WBC 11.48  7.84    RBC 4.39  4.17    Hemoglobin 13.4  12.8    Hematocrit 40.3  39.0    MCV 91.8  93.5    MCH 30.5  30.7    MCHC 33.3  32.8    RDW 12.0  12.4    Platelets 348  279            Assessment and Plan    Diagnoses and all orders for this visit:    1. Irritable bowel syndrome with diarrhea (Primary)  -     riFAXIMin (Xifaxan) 550 MG tablet; Take 1 tablet by mouth Every 8 (Eight) Hours for 14 days.  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  Would focus on increasing dietary fiber with fruits and vegetables.  Check celiac panel  Recommend screening colonoscopy in 2029 with history of tubular adenoma colon polyps.    Follow Up   Return in about 6 weeks (around 3/4/2025) for Dr. Cabrera or Smita Odom PA-C.    Dragon dictation used throughout this note.            Marly Young PA-C   [facility]  [address] [place] [address]  Office: [phone]

## 2025-01-22 ENCOUNTER — SPECIALTY PHARMACY (OUTPATIENT)
Dept: GASTROENTEROLOGY | Facility: CLINIC | Age: 50
End: 2025-01-22
Payer: COMMERCIAL

## 2025-01-22 LAB
ENDOMYSIUM IGA SER QL: NEGATIVE
GLIADIN PEPTIDE IGA SER-ACNC: 4 UNITS (ref 0–19)
GLIADIN PEPTIDE IGG SER-ACNC: 2 UNITS (ref 0–19)
IGA SERPL-MCNC: 329 MG/DL (ref 87–352)
TTG IGA SER-ACNC: <2 U/ML (ref 0–3)
TTG IGG SER-ACNC: <2 U/ML (ref 0–5)

## 2025-01-22 NOTE — PROGRESS NOTES
Xifaxan PA approved  PA # [id number]  Key: [id number]  Authorization Expiration Date: February 4, 2025.      Karishma Allen  Specialty Pharmacy Technician

## 2025-02-05 DIAGNOSIS — M17.11 PRIMARY OSTEOARTHRITIS OF RIGHT KNEE: ICD-10-CM

## 2025-02-05 RX ORDER — CELECOXIB 200 MG/1
200 CAPSULE ORAL DAILY PRN
Qty: 30 CAPSULE | Refills: 2 | Status: SHIPPED | OUTPATIENT
Start: 2025-02-05

## 2025-03-11 DIAGNOSIS — K58.0 IRRITABLE BOWEL SYNDROME WITH DIARRHEA: ICD-10-CM

## 2025-03-12 RX ORDER — RIFAXIMIN 550 MG/1
TABLET ORAL
Qty: 42 TABLET | Refills: 0 | Status: SHIPPED | OUTPATIENT
Start: 2025-03-12

## 2025-03-19 ENCOUNTER — OFFICE VISIT (OUTPATIENT)
Dept: GASTROENTEROLOGY | Facility: CLINIC | Age: 50
End: 2025-03-19
Payer: COMMERCIAL

## 2025-03-19 VITALS
DIASTOLIC BLOOD PRESSURE: 84 MMHG | HEART RATE: 78 BPM | SYSTOLIC BLOOD PRESSURE: 132 MMHG | WEIGHT: 239.3 LBS | BODY MASS INDEX: 37.56 KG/M2 | TEMPERATURE: 97.6 F | HEIGHT: 67 IN

## 2025-03-19 DIAGNOSIS — K58.0 IRRITABLE BOWEL SYNDROME WITH DIARRHEA: Primary | ICD-10-CM

## 2025-03-19 PROCEDURE — 99214 OFFICE O/P EST MOD 30 MIN: CPT

## 2025-03-19 NOTE — PROGRESS NOTES
"Chief Complaint  Diarrhea    Subjective          History of Present Illness    Valeria Michel is a  49 y.o. female presents for follow-up for diarrhea.  She is a patient of Dr. Cabrera and is new to me.  Last seen in the office by Marly Young PA-C.    At last office visit she reported that she has had ongoing diarrhea for many years.  Typically 1 episode a day.  No black or bloody stool.  She was given a trial of Xifaxan.  She reports that she did not take as directed and will take 1 pill 1 day, 2 pills the next, etc. she reports that her symptoms are about the same as they were at last office visit.  No black or bloody stool or abdominal pain.  She does use Imodium as needed.    Celiac panel was normal.    Patient had a colonoscopy with Dr. Olmedo with one 5 mm sessile polyp in the descending colon.  Biopsies consistent with tubular adenoma.  Recall 5 years. Repeat 2029.    Objective   Vital Signs:   /84 (BP Location: Left arm, Patient Position: Sitting, Cuff Size: Large Adult)   Pulse 78   Temp 97.6 °F (36.4 °C) (Temporal)   Ht 170.2 cm (67\")   Wt 109 kg (239 lb 4.8 oz)   BMI 37.48 kg/m²       Physical Exam  Constitutional:       General: She is not in acute distress.     Appearance: Normal appearance.   Eyes:      General: No scleral icterus.  Pulmonary:      Effort: Pulmonary effort is normal.   Skin:     Coloration: Skin is not jaundiced.   Neurological:      General: No focal deficit present.      Mental Status: She is alert and oriented to person, place, and time.   Psychiatric:         Mood and Affect: Mood normal.         Behavior: Behavior normal.          Result Review :   The following data was reviewed by: Smita Odom PA-C on 03/19/2025:  CMP          4/3/2024    13:10   CMP   Glucose 68    BUN 15    Creatinine 0.79    EGFR 93.0    Sodium 138    Potassium 4.2    Chloride 103    Calcium 8.5    BUN/Creatinine Ratio 19.0      CBC          4/3/2024    13:10   CBC   WBC 7.84    RBC " 4.17    Hemoglobin 12.8    Hematocrit 39.0    MCV 93.5    MCH 30.7    MCHC 32.8    RDW 12.4    Platelets 279              Assessment:   Diagnoses and all orders for this visit:    1. Irritable bowel syndrome with diarrhea (Primary)          Plan:   -Recommend she increase fiber in her diet.  -Okay to use Imodium as needed.  Could also consider colestipol if no improvement with increased fiber  -Will be due for screening colonoscopy 2029 with history of tubular adenomas.      Follow Up   Return in about 3 months (around 6/19/2025).    Dragon dictation used throughout this note.         Smita Odmo PA-C  [facility]  [address] [place] [address]  Office: [phone]

## 2025-03-31 ENCOUNTER — TELEPHONE (OUTPATIENT)
Dept: GASTROENTEROLOGY | Facility: CLINIC | Age: 50
End: 2025-03-31
Payer: COMMERCIAL

## 2025-03-31 NOTE — TELEPHONE ENCOUNTER
Hub staff attempted to follow warm transfer process and was unsuccessful     Caller: Valeria Michel    Relationship to patient: Self    Best call back number: [phone]    Patient is needing: PATIENT IS STATING SHE WAS RECOMMENDED SPECIFIC PROBIOTICS FROM PATRIC QIU AND WOULD LIKE TO KNOW EXACTLY WHICH IT WAS.  PLEASE REACH OUT.  THANK YOU

## 2025-06-14 DIAGNOSIS — M17.11 PRIMARY OSTEOARTHRITIS OF RIGHT KNEE: ICD-10-CM

## 2025-06-16 RX ORDER — CELECOXIB 200 MG/1
200 CAPSULE ORAL DAILY PRN
Qty: 30 CAPSULE | Refills: 2 | Status: SHIPPED | OUTPATIENT
Start: 2025-06-16

## 2025-06-16 NOTE — TELEPHONE ENCOUNTER
Please advise CAR is OUT of office   [phone]      Last FD 02/05/2025 + 2 RF   Last OV 06/18/2024  Next OV N/A

## (undated) DEVICE — ADAPT CLN BIOGUARD AIR/H2O DISP

## (undated) DEVICE — LN SMPL CO2 SHTRM SD STREAM W/M LUER

## (undated) DEVICE — CANN O2 ETCO2 FITS ALL CONN CO2 SMPL A/ 7IN DISP LF

## (undated) DEVICE — SNAR POLYP SENSATION STDOVL 27 240 BX40

## (undated) DEVICE — TUBING, SUCTION, 1/4" X 10', STRAIGHT: Brand: MEDLINE

## (undated) DEVICE — SENSR O2 OXIMAX FNGR A/ 18IN NONSTR

## (undated) DEVICE — THE SINGLE USE ETRAP – POLYP TRAP IS USED FOR SUCTION RETRIEVAL OF ENDOSCOPICALLY REMOVED POLYPS.: Brand: ETRAP

## (undated) DEVICE — KT ORCA ORCAPOD DISP STRL

## (undated) DEVICE — PATIENT RETURN ELECTRODE, SINGLE-USE, CONTACT QUALITY MONITORING, ADULT, WITH 9FT CORD, FOR PATIENTS WEIGING OVER 33LBS. (15KG): Brand: MEGADYNE